# Patient Record
Sex: FEMALE | Race: WHITE | Employment: FULL TIME | URBAN - METROPOLITAN AREA
[De-identification: names, ages, dates, MRNs, and addresses within clinical notes are randomized per-mention and may not be internally consistent; named-entity substitution may affect disease eponyms.]

---

## 2018-07-25 ENCOUNTER — HOSPITAL ENCOUNTER (OUTPATIENT)
Dept: PHYSICAL THERAPY | Age: 33
Discharge: HOME | End: 2018-07-25
Payer: COMMERCIAL

## 2018-07-25 PROCEDURE — 97016 VASOPNEUMATIC DEVICE THERAPY: CPT

## 2018-07-25 PROCEDURE — 97162 PT EVAL MOD COMPLEX 30 MIN: CPT

## 2018-07-25 PROCEDURE — 97110 THERAPEUTIC EXERCISES: CPT

## 2018-07-25 NOTE — PROGRESS NOTES
Emily Rincon 31  [facility] [address], [address], [facility], [facility] - Phone: [phone]  Fax: [phone] / [address] [facility]  Patient Name: Rod Curtis : 1985   Medical   Diagnosis: Left knee pain [M25.562] Treatment Diagnosis: Left knee pain [M25.562]   Onset Date: 2018     Referral Source: Ekaterina Schulz MD Start of Care [facility]): 2018   Prior Hospitalization: See medical history Provider #: [id number]   Prior Level of Function: Independent and pain free ambulation and recreational activity   Comorbidities: HTN    Medications: Verified on Patient Summary List   The Plan of Care and following information is based on the information from the initial evaluation.   ==================================================================================  Assessment / key information:  Patient is a 29 y.o. female who presents to [facility] at [facility] with diagnosis of Left knee pain [M25.562]. Patient is s/p L knee dislocation 2018. Pt describes left knee pain as an intermittent stiffness located in the anterior aspect of the knee. Patient's pain level is rated as 1/10 at the best, 3/10 currently, and 8/10 at the worst. Pain increases with knee flexion, prolonged ambulation (45 minutes), prolonged standing, and descending stairs, decreases with rest. Gait analysis revealed antalgic gait abnormality and demonstrated decreased L LE step length and asymetrical L LE swing. Upon objective evaluation patient presents with impaired and painful AROM/PROM of left knee, inferior patellar hypomobility, impaired knee strength in flexion and extension, edema of 0.75cm  at the joint line and decreased flexibility of  hamstring, quadriceps and, gastroc muscles. AROM/PROM of left knee as follows:  flexion 75/86 and extension -10/-5.  Patient scored 51 on FOTO indicating decreased function and quality of life. Patient can benefit from skilled PT to increase left knee ROM, strength, joint mobility, flexibility, and balance, decrease swelling, tone, TTP, and pain to improve overall function and quality of life.  ==================================================================================  Problem List: pain affecting function, decrease ROM, decrease strength, edema affecting function, impaired gait/ balance, decrease ADL/ functional abilities, decrease activity tolerance, decrease flexibility/ joint mobility, decrease transfer abilities. Treatment Plan may include any combination of the following: Therapeutic exercise, Therapeutic activities, Neuromuscular re-education, Physical agent/modality, Gait/balance training, Manual therapy, Aquatic therapy and Patient education  Patient / Family readiness to learn indicated by: asking questions, trying to perform skills and interest  Persons(s) to be included in education: patient (P)  Barriers to Learning/Limitations: no  Measures taken:    Patient Goal (s): \"to get ROM back in my left knee\"   Patient self reported health status: good  Rehabilitation Potential: good   Short Term Goals: To be accomplished in 2  weeks:  1) Establish home exercise program.  2) Patient will report decreased c/o pain to < or = 6/10 to facilitate prolonged standing with manageable sx in left knee. 3) Increase left knee AROM by  6-8 degrees to facilitate improved gait mechanics with ambulation.  Long Term Goals: To be accomplished in  4  weeks:  1) Patient independent with HEP. 2) Patient will improve left knee AROM to 0-135 degrees to facilitate improved improved gait mechanics with ambulation. 3) Increase FOTO to 69 indicating improved function and quality of life. 4) Patient will increase left knee strength in flexion and extension to 4+/5 so patient has improved ability to perform household chores.   5) Patient to increased walking tolerance to 1 hour without pain in order improve ease with grocery shopping. 6) Patient with demonstrate reciprocal navigation of 2 flights of 6\" stairs without HR in order to facilitate improved ability to navigate stairs to return to OF. 7) Patient will demonstrate good eccentric quad control in forward tap down off 4 inch step with no compensation to facilitate normalized gait pattern for stair negotiation. Frequency / Duration:   Patient to be seen  2-3  times per week for 4-5  weeks:  Patient / Caregiver education and instruction: self care, activity modification and exercises  G-Codes (GP): n/a  Eval Complexity: History: MEDIUM  Complexity : 1-2 comorbidities / personal factors will impact the outcome/ POC Exam:HIGH Complexity : 4+ Standardized tests and measures addressing body structure, function, activity limitation and / or participation in recreation  Presentation: MEDIUM Complexity : Evolving with changing characteristics  Clinical Decision Making:MEDIUM Complexity : FOTO score of 26-74Overall Complexity:MEDIUM    Therapist Signature: Kelly Hadley Date: 6/82/6016   Certification Period: n/a Time: 8:24 AM   ==================================================================================  I certify that the above Physical Therapy Services are being furnished while the patient is under my care. I agree with the treatment plan and certify that this therapy is necessary. Physician Signature:        Date:       Time:     Please sign and return to [facility] or you may fax the signed copy to [phone]. Thank you.

## 2018-07-25 NOTE — PROGRESS NOTES
PHYSICAL THERAPY - DAILY TREATMENT NOTE    Patient Name: Nabila Monzon        Date: 2018  : 1985   YES Patient  Verified  Visit #:     Insurance: Payor: Talita Melton / Plan: Trinidad GAMINO / Product Type: Commerical /      In time: 8:34 am Out time: 9:30am   Total Treatment Time: 56     Medicare Time Tracking (below)   Total Timed Codes (min):  n/a 1:1 Treatment Time:  n/a     TREATMENT AREA =  Left knee pain [M25.562]  SUBJECTIVE  Pain Level (on 0 to 10 scale):  2-3  / 10   Medication Changes/New allergies or changes in medical history, any new surgeries or procedures? NO    If yes, update Summary List   Subjective Functional Status/Changes:  []  No changes reported     See POC       OBJECTIVE  Modalities Rationale:     decrease inflammation and decrease pain to improve patient's ability to perform functional ADLs   min [] Estim, type/location:                                      []  att     []  unatt     []  w/US     []  w/ice    []  w/heat    min []  Mechanical Traction: type/lbs                   []  pro   []  sup   []  int   []  cont    []  before manual    []  after manual    min []  Ultrasound, settings/location:      min []  Iontophoresis w/ dexamethasone, location:                                               []  take home patch       []  in clinic    min []  Ice     []  Heat    location/position:    10 min [x]  Vasopneumatic Device, press/temp: left knee, low pp, low temp in supine     min []  Other:    [x] Skin assessment post-treatment (if applicable):    [x]  intact    []  redness- no adverse reaction     []redness  adverse reaction:        18 min Therapeutic Exercise:  [x]  See flow sheet   Rationale:      increase ROM and increase strength to improve the patients ability to tolerate prolonged standing. min Patient Education:  YES  Reviewed HEP   []  Progressed/Changed HEP based on:         Other Objective/Functional Measures:   Physical Therapy Evaluation - Knee    Gait:  [] Normal    [] Abnormal    [x] Antalgic    [] NWB    Device: decreased L stance  Stair Navigation: Reciprocal ascending and non reciprocal descending stairs  R/L SLS on Foam/Firm: 30/29 seconds and 30/30 seconds    ROM / Strength  [] Unable to assess                  AROM                      PROM                   Strength (1-5)    Left Right Left Right Left Right   Hip Flexion     4- 4+    Extension     4+ 4+    Abduction     4 4+    Adduction     4- NT   Knee Flexion 75 138 86  2+ 5    Extension -10 +5 -5  2+ 5   Ankle Plantarflexion     4 4    Dorsiflexion     5- 5-     Flexibility: [] Unable to assess at this time  Hamstrings: R175/L158 deg   (L) Tightness= [] WNL   [] Min   [] Mod   [x] Severe     (R) Tightness= [x] WNL   [] Min   [] Mod   [] Severe  Quadriceps: R 125/L83   (L) Tightness= [] WNL   [] Min   [] Mod   [x] Severe    (R) Tightness= [] WNL   [x] Min   [] Mod   [] Severe  Gastroc:  R >neutral/L neutral   (L) Tightness= [] WNL   [] Min   [x] Mod   [] Severe    (R) Tightness= [] WNL   [x] Min   [] Mod   [] Severe    Optional Tests:  Patellar Mobility   []L [x]R Hypermobile [x]L []R Hypomobile (inferior patellar mob)        Girth Measurements:     Cm at  Cm above joint line   Cm at   Cm below joint line  Cm at joint line   Left     42.5 cm   Right      41.75 cm       Justification for Eval Complexity:   Patient History: MEDIUM  Complexity : 1-2 comorbidities / personal factors will impact the outcome/ POC  (HTN and Pregnancy)  Examination:HIGH Complexity : 4+ Standardized tests and measures addressing body structure, function, activity limitation and / or participation in recreation  (See POC and musculoskeletal examination attached)  Clinical Presentation: MEDIUM Complexity : Evolving with changing characteristics  (pain level 3/10 on average and 8/10 @ worst, intermittent pain)  Clinical Decision Making:MEDIUM Complexity : FOTO score of 26-74 (Foto 51/100)  Overall Complexity:MEDIUM Post Treatment Pain Level (on 0 to 10) scale:   2  / 10     ASSESSMENT  Assessment/Changes in Function:       See POC     []  See Progress Note/Recertification   Patient will continue to benefit from skilled PT services to modify and progress therapeutic interventions, address functional mobility deficits, address ROM deficits, address strength deficits, analyze and address soft tissue restrictions, analyze and cue movement patterns, analyze and modify body mechanics/ergonomics and assess and modify postural abnormalities to attain remaining goals.    Progress toward goals / Updated goals:    See POC     PLAN  [x]  Upgrade activities as tolerated YES Continue plan of care   []  Discharge due to :    []  Other:      Therapist: Manjit Dubose DPT     Date: 7/25/2018 Time: 8:24 AM     Future Appointments  Date Time Provider Soo Razo   7/31/2018 11:30 AM [address]   8/2/2018 5:30 PM Paige Rivas [facility]   8/7/2018 5:30 PM Paige Rivas [facility]   8/9/2018 5:30 PM Paige Rivas [facility]   8/14/2018 5:30 PM Paige Rivas, [facility]   8/16/2018 5:30 PM Paige Rivas [facility]   8/21/2018 5:30 PM Paige Rivas, [facility]   8/23/2018 5:30 PM Paige Rivas [facility]   8/28/2018 5:30 PM Paige Rivas, [facility]   8/30/2018 5:30 PM Paige Rivas, [facility]

## 2018-07-26 ENCOUNTER — HOSPITAL ENCOUNTER (OUTPATIENT)
Dept: PHYSICAL THERAPY | Age: 33
Discharge: HOME | End: 2018-07-26
Payer: COMMERCIAL

## 2018-07-26 PROCEDURE — 97140 MANUAL THERAPY 1/> REGIONS: CPT

## 2018-07-26 PROCEDURE — 97110 THERAPEUTIC EXERCISES: CPT

## 2018-07-26 PROCEDURE — 97016 VASOPNEUMATIC DEVICE THERAPY: CPT

## 2018-07-26 NOTE — PROGRESS NOTES
PHYSICAL THERAPY - DAILY TREATMENT NOTE    Patient Name: Nabila Farris        Date: 2018  : 1985   YES Patient  Verified  Visit #:     Insurance: Payor: Danilo Zavala / Plan: [employer] / Product Type: Commerical /      In time: 5:18 pm Out time: 6:10pm   Total Treatment Time: 52     Medicare Time Tracking (below)   Total Timed Codes (min):  n/a 1:1 Treatment Time:  n/a     TREATMENT AREA =  Left knee pain [M25.562]    SUBJECTIVE  Pain Level (on 0 to 10 scale):  2  / 10   Medication Changes/New allergies or changes in medical history, any new surgeries or procedures? NO    If yes, update Summary List   Subjective Functional Status/Changes:  []  No changes reported     Pt states \"Sintia been doing my exercises at my desk, I felt a little numbness earlier but its gone now\"          OBJECTIVE  Modalities Rationale:     decrease edema, decrease inflammation and decrease pain to improve patient's ability to perform ADLs.      min [] Estim, type/location:                                      []  att     []  unatt     []  w/US     []  w/ice    []  w/heat    min []  Mechanical Traction: type/lbs                   []  pro   []  sup   []  int   []  cont    []  before manual    []  after manual    min []  Ultrasound, settings/location:      min []  Iontophoresis w/ dexamethasone, location:                                               []  take home patch       []  in clinic    min []  Ice     []  Heat    location/position:    8 min [x]  Vasopneumatic Device, press/temp: left knee, mod pp, low temp in supine     min []  Other:    [x] Skin assessment post-treatment (if applicable):    [x]  intact    []  redness- no adverse reaction     []redness  adverse reaction:        36 min Therapeutic Exercise:  [x]  See flow sheet   Rationale:      increase ROM and increase strength to improve the patients ability to tolerate prolonged standing and walking    8 min Manual Therapy: L knee grade 1/2 superior and inferior patellar mobes, L knee PROM into flexion and extension in supine   Rationale:      decrease pain, increase ROM, increase tissue extensibility and decrease trigger points to improve patient's ability to ambulate with normalized gait. min Patient Education:  YES  Reviewed HEP   []  Progressed/Changed HEP based on: Other Objective/Functional Measures: Added rocking  R knee AROM = -9/-6 to 98/108 deg     Post Treatment Pain Level (on 0 to 10) scale:   3  / 10     ASSESSMENT  Assessment/Changes in Function:     Unable to perform full revolutions on recumbent bike thus, warmed up with rocking on bike in order for L knee flexion AAROM. Demonstrated R LE weight shift compensatory movement with mini squatting, VCing for even weightbearing. []  See Progress Note/Recertification   Patient will continue to benefit from skilled PT services to modify and progress therapeutic interventions, address functional mobility deficits, address ROM deficits, address strength deficits, analyze and address soft tissue restrictions, analyze and cue movement patterns, analyze and modify body mechanics/ergonomics, assess and modify postural abnormalities, address imbalance/dizziness and instruct in home and community integration to attain remaining goals. Progress toward goals / Updated goals:    First visit after initial evaluation. Progress tx per POC.         PLAN  [x]  Upgrade activities as tolerated YES Continue plan of care   []  Discharge due to :    []  Other:      Therapist: Brian Cabral    Date: 7/26/2018 Time: 6:52 AM     Future Appointments  Date Time Provider Soo Razo   7/26/2018 5:00 PM [address]   7/31/2018 11:30 AM [address]   8/2/2018 5:30 PM Agusto Callaway PTA [facility]   8/7/2018 5:30 PM Agusto Callaway PTA [facility]   8/9/2018 5:30 PM Agusto Callaway PTA [facility]   8/14/2018 5:30 PM Agusto Callaway PTA [facility]   8/16/2018 5:30 PM Renu Wesley, [facility]   8/21/2018 5:30 PM Renu Wesley, [facility]   8/23/2018 5:30 PM Renu Wesley, [facility]   8/28/2018 5:30 PM Renu Wesley, [facility]   8/30/2018 5:30 PM Renu Wesley, [facility]

## 2018-07-31 ENCOUNTER — HOSPITAL ENCOUNTER (OUTPATIENT)
Dept: PHYSICAL THERAPY | Age: 33
Discharge: HOME | End: 2018-07-31
Payer: COMMERCIAL

## 2018-07-31 PROCEDURE — 97140 MANUAL THERAPY 1/> REGIONS: CPT

## 2018-07-31 PROCEDURE — 97016 VASOPNEUMATIC DEVICE THERAPY: CPT

## 2018-07-31 PROCEDURE — 97110 THERAPEUTIC EXERCISES: CPT

## 2018-07-31 NOTE — PROGRESS NOTES
PHYSICAL THERAPY - DAILY TREATMENT NOTE Patient Name: Ramses Abdul        Date: 2018 : 1985   YES Patient  Verified Visit #:   3   of   12  Insurance: Payor: [address] [place] [address] / Plan: Leroy GAMINO / Product Type: Commerical / In time: 11:38 Out time: 12:36 Total Treatment Time: 62 Medicare Time Tracking (below) Total Timed Codes (min):  na 1:1 Treatment Time:  na  
 
TREATMENT AREA =  Left knee pain [M25.562] SUBJECTIVE Pain Level (on 0 to 10 scale):  3  / 10 Medication Changes/New allergies or changes in medical history, any new surgeries or procedures? NO    If yes, update Summary List  
Subjective Functional Status/Changes:  []  No changes reported Increased medial knee pain over the weekend. Pain went as high as /10. More brave with stairs. Doing mostly step over step. OBJECTIVE Modalities Rationale:  decrease edema, decrease inflammation and decrease pain to improve patient's ability to perform ADLs. min [] Estim, type/location:    
                                 []  att     []  unatt     []  w/US     []  w/ice    []  w/heat  
 min []  Mechanical Traction: type/lbs   
               []  pro   []  sup   []  int   []  cont    []  before manual    []  after manual  
 min []  Ultrasound, settings/location:    
 min []  Iontophoresis w/ dexamethasone, location:   
                                           []  take home patch       []  in clinic  
 min []  Ice     []  Heat    location/position:   
10 min [x]  Vasopneumatic Device, press/temp: left knee, mod pp, low temp in supine   
 min []  Other:   
[x] Skin assessment post-treatment (if applicable):   
[x]  intact    []  redness- no adverse reaction    
[]redness  adverse reaction:     
40 min Therapeutic Exercise:  [x]  See flow sheet Rationale:      increase ROM and increase strength to improve the patients ability to tolerate prolonged standing and walking 
  
8 min Manual Therapy: L knee grade 1/2 superior and inferior patellar mobes,  Retrograde massage with patient instruction in supine. Rationale:      decrease pain, increase ROM, increase tissue extensibility and decrease trigger points to improve patient's ability to ambulate with normalized gait.  
  
 
 
 min Patient Education:  YES  Reviewed HEP []  Progressed/Changed HEP based on:  Retrograde massage followed by ice 10 minutes. Do not go into a pain zone with exercise or ADLs. Other Objective/Functional Measures: AROM -5 to 110 pretreatment, 0-115 after treatment. atient able to do full revolution on recumbent bike for 1 minute. Added tap downs, TKE with ball. Post Treatment Pain Level (on 0 to 10) scale:   1  / 10 ASSESSMENT Assessment/Changes in Function:  
 
Increasing AROM of L knee. Gait still mildly antalgic. Quad set still decreased. []  See Progress Note/Recertification Patient will continue to benefit from skilled PT services to modify and progress therapeutic interventions, address functional mobility deficits, address ROM deficits, address strength deficits, analyze and address soft tissue restrictions, analyze and cue movement patterns, analyze and modify body mechanics/ergonomics, assess and modify postural abnormalities, address imbalance/dizziness and instruct in home and community integration to attain remaining goals Progress toward goals / Updated goals: 
 
1) Establish home exercise program.  Met 
2) Patient will report decreased c/o pain to < or = 6/10 to facilitate prolonged standing with manageable sx in left knee. 3) Increase left knee AROM by  6-8 degrees to facilitate improved gait mechanics with ambulation. Met PLAN [x]  Upgrade activities as tolerated YES Continue plan of care  
[]  Discharge due to :   
[]  Other:   
 
Therapist: Leobardo Grace PT Date: 7/31/2018 Time: 11:27 AM  
 
Future Appointments Date Time Provider Soo Razo 8/2/2018 5:30 PM Jesus Castro PTA [facility]  
8/7/2018 5:30 PM Cristino Pallas, [facility]  
8/9/2018 5:30 PM Cristino Pallas, [facility]  
8/14/2018 5:30 PM Cristino Pallas, [facility]  
8/16/2018 5:30 PM Cristino Pallas, [facility]  
8/21/2018 5:30 PM Cristino Pallas, [facility]  
8/23/2018 5:30 PM Cristino Pallas, [facility]  
8/28/2018 5:30 PM Cristino Pallas, [facility]  
8/30/2018 5:30 PM Cristino Pallas, [facility]

## 2018-08-02 ENCOUNTER — HOSPITAL ENCOUNTER (OUTPATIENT)
Dept: PHYSICAL THERAPY | Age: 33
Discharge: HOME | End: 2018-08-02
Payer: COMMERCIAL

## 2018-08-02 PROCEDURE — 97110 THERAPEUTIC EXERCISES: CPT

## 2018-08-02 PROCEDURE — 97016 VASOPNEUMATIC DEVICE THERAPY: CPT

## 2018-08-02 NOTE — PROGRESS NOTES
PHYSICAL THERAPY - DAILY TREATMENT NOTE Patient Name: Renee Tyson        Date: 2018 : 1985   YES Patient  Verified Visit #:     Insurance: Payor: [employer] / Plan: Jenny Clark RPN / Product Type: Commerical / In time: 5:40 pm Out time: 6;40 pm  
Total Treatment Time: 60 Medicare Time Tracking (below) Total Timed Codes (min):  n/a 1:1 Treatment Time:  n/a  
 
TREATMENT AREA =  Left knee pain [M25.562] SUBJECTIVE Pain Level (on 0 to 10 scale): 2-3  / 10 Medication Changes/New allergies or changes in medical history, any new surgeries or procedures? NO    If yes, update Summary List  
Subjective Functional Status/Changes:  []  No changes reported Pt reports it feels fine its just when I try to do stairs that and the weather. Achy -pain today. OBJECTIVE Modalities Rationale:     decrease edema, decrease inflammation, decrease pain and increase tissue extensibility to improve patient's ability to perform prolonged standing, walking, descending stairs 
 min [] Estim, type/location:    
                                 []  att     []  unatt     []  w/US     []  w/ice    []  w/heat  
 min []  Mechanical Traction: type/lbs   
               []  pro   []  sup   []  int   []  cont    []  before manual    []  after manual  
 min []  Ultrasound, settings/location:    
 min []  Iontophoresis w/ dexamethasone, location:   
                                           []  take home patch       []  in clinic  
 min []  Ice     []  Heat    location/position:   
10 min [x]  Vasopneumatic Device, press/temp: 26 degrees med  
 min []  Other:   
[x] Skin assessment post-treatment (if applicable):   
[x]  intact    []  redness- no adverse reaction    
[]redness  adverse reaction:     
 
50 min Therapeutic Exercise:  [x]  See flow sheet Rationale:      increase ROM and increase strength to improve the patients ability to  perform prolonged standing, walking, descending stairs min Patient Education:  YES  Reviewed HEP []  Progressed/Changed HEP based on: Other Objective/Functional Measures: AROM: +3 to 132 degrees Increase bike x 5 min rocking to full ROM, TKE to 10 reps Post Treatment Pain Level (on 0 to 10) scale:  1  / 10 ASSESSMENT Assessment/Changes in Function:  
See progress note [x]  See Progress Note/Recertification Patient will continue to benefit from skilled PT services to modify and progress therapeutic interventions, address functional mobility deficits, address ROM deficits, address strength deficits and instruct in home and community integration to attain remaining goals. Progress toward goals / Updated goals: 
 
See progress note PLAN 
[]  Upgrade activities as tolerated YES Continue plan of care  
[]  Discharge due to :   
[]  Other:   
 
Therapist: Bailey Armstrong PTA Date: 8/2/2018 Time: 6:40 PM  
 
Future Appointments Date Time Provider Soo Razo 8/7/2018 5:30 PM Bailey Patti, PTA [facility]  
8/9/2018 5:30 PM Bailey Patti, PTA [facility]  
8/14/2018 5:30 PM Bailey Patti, PTA [facility]  
8/16/2018 5:30 PM Bailey Patti, PTA [facility]  
8/21/2018 5:30 PM Bailey Patti, PTA [facility]  
8/23/2018 5:30 PM Bailey Patti, [facility]  
8/28/2018 5:30 PM Bailey Patti, PTA [facility]  
8/30/2018 5:30 PM Bailey Patti, [facility]

## 2018-08-02 NOTE — PROGRESS NOTES
[facility] PHYSICAL THERAPY AT [address], [place] [address], [place] [address] - Phone: [phone]  Fax: [phone] PROGRESS NOTE Patient Name: Abel Echevarria : 1985 Treatment/Medical Diagnosis: Left knee pain [M25.562] Referral Source: Andreina Greenberg MD    
Date of Initial Visit: 18 Attended Visits: 4 Missed Visits: 0  
 
SUMMARY OF TREATMENT Physical Therapy treatment has consisted of Therapeutic exercise for L LE ROM and strengthening, Manual Therapy, HEP, ice with compression. CURRENT STATUS Patient has progressed well in Physical Therapy, consistently reporting improving ROM, decreasing pain, and increased functional ability. Functional deficits: increases with knee flexion, prolonged walking, prolonged standing, descending stairs. Pt's current pain range is 0 to 4/10. Pt reports intermittent use of knee brace for support. Functional improvements are knee flexion, prolonged walking, prolonged standing, descending stairs  Pt would benefit from continued PT intervention in order to improve knee  AROM/PROM, strength, flexibility, Functional mobility, and address remaining impairments. Goal/Measure of Progress Goal Met? 1.   Establish home exercise program  
Status at last Eval: dependent Current Status: Pt instructed in initial HEP. yes 2. Patient will report decreased c/o pain to < or = 6/10 to facilitate prolonged standing with manageable sx in left knee Status at last Eval: Pain: 1-8/10 Current Status: Pain: 0 to 4/10 yes 3. Increase left knee AROM by  6-8 degrees to facilitate improved gait mechanics with ambulation Status at last Eval: -10 to 75 degrees Current Status: +3 to 132 degrees yes New Goals to be achieved in __4__  weeks: 
1) Patient independent with HEP. 2) Increase FOTO to 69 indicating improved function and quality of life.  
3) Patient will increase left knee strength in flexion and extension to 4+/5 so patient has improved ability to perform household chores. 4) Patient to increased walking tolerance to 1 hour without pain in order improve ease with grocery shopping. 5) Patient with demonstrate reciprocal navigation of 2 flights of 6\" stairs without HR in order to facilitate improved ability to navigate stairs to return to PLOF. 6) Patient will demonstrate good eccentric quad control in forward tap down off 4 inch step with no compensation to facilitate normalized gait pattern for stair negotiation. 
  
RECOMMENDATIONS Plan to continue 2x week x 4 weeks If you have any questions/comments please contact us directly at  [phone]. Thank you for allowing us to assist in the care of your patient. LPTA Signature: Bailey Armstrong, PTA  Date: 8/2/2018 PT Signature: Rupert Howard PT Time: 6:30 PM  
NOTE TO PHYSICIAN:  PLEASE COMPLETE THE ORDERS BELOW AND FAX TO [facility] Physical Therapy: [phone]. If you are unable to process this request in 24 hours please contact our office:  [phone]. 
 
___ I have read the above report and request that my patient continue as recommended.  
___ I have read the above report and request that my patient continue therapy with the following changes/special instructions:_________________________________________________________  
___ I have read the above report and request that my patient be discharged from therapy.   
 
Physician Signature:        Date:       Time:

## 2018-08-07 ENCOUNTER — HOSPITAL ENCOUNTER (OUTPATIENT)
Dept: PHYSICAL THERAPY | Age: 33
Discharge: HOME | End: 2018-08-07
Payer: COMMERCIAL

## 2018-08-07 PROCEDURE — 97016 VASOPNEUMATIC DEVICE THERAPY: CPT

## 2018-08-07 PROCEDURE — 97110 THERAPEUTIC EXERCISES: CPT

## 2018-08-07 NOTE — PROGRESS NOTES
PHYSICAL THERAPY - DAILY TREATMENT NOTE    Patient Name: April OTILIA Rutledge        Date: 2018  : 1985   YES Patient  Verified  Visit #:     Insurance: Payor: Franco Mazariegos / Plan: Rina GAMINO / Product Type: Commerical /      In time: 5:29 pm Out time: 6:19 pm   Total Treatment Time: 50     Medicare Time Tracking (below)   Total Timed Codes (min): n/a 1:1 Treatment Time:  n/a     TREATMENT AREA =  Left knee pain [M25.562]    SUBJECTIVE  Pain Level (on 0 to 10 scale):  2  / 10   Medication Changes/New allergies or changes in medical history, any new surgeries or procedures? NO    If yes, update Summary List   Subjective Functional Status/Changes:  []  No changes reported     Pt reports she saw MD. He  Wants me in the brace. Pt s main complaint: tenderness medial knee.  \"When I take the stairs I can feel the pull\"       OBJECTIVE  Modalities Rationale:     decrease edema, decrease inflammation, decrease pain and increase tissue extensibility to improve patient's ability to descend stairs   min [] Estim, type/location:                                      []  att     []  unatt     []  w/US     []  w/ice    []  w/heat    min []  Mechanical Traction: type/lbs                   []  pro   []  sup   []  int   []  cont    []  before manual    []  after manual    min []  Ultrasound, settings/location:      min []  Iontophoresis w/ dexamethasone, location:                                               []  take home patch       []  in clinic    min []  Ice     []  Heat    location/position:    10 min [x]  Vasopneumatic Device, press/temp: Low 34 degrees left knee    min []  Other:    [x] Skin assessment post-treatment (if applicable):    [x]  intact    []  redness- no adverse reaction     []redness  adverse reaction:        40 min Therapeutic Exercise:  [x]  See flow sheet   Rationale:      increase ROM and increase strength to improve the patients ability to descend stairs      min Patient Education:  Alyce An Reviewed HEP   []  Progressed/Changed HEP based on: Other Objective/Functional Measures: Add supine hip ADD, increased reps 4 inch tap downs  AROM: +1 to 133 degrees     Post Treatment Pain Level (on 0 to 10) scale:   0 / 10     ASSESSMENT  Assessment/Changes in Function:   Pt requested to decrease PT to 1x per week secondary to financial reasons. Exercises in standing performed in brace. Pt demonstrating mild muscle guarding/stiffness in AROM supine knee flexion. pt education in proper supportive foot wear, use of brace for support per MD recommendations. []  See Progress Note/Recertification   Patient will continue to benefit from skilled PT services to modify and progress therapeutic interventions, address functional mobility deficits, address ROM deficits, address strength deficits, analyze and address soft tissue restrictions and instruct in home and community integration to attain remaining goals. Progress toward goals / Updated goals:  1) Patient independent with HEP. 2) Increase FOTO to 69 indicating improved function and quality of life. 3) Patient will increase left knee strength in flexion and extension to 4+/5 so patient has improved ability to perform household chores. 4) Patient to increased walking tolerance to 1 hour without pain in order improve ease with grocery shopping. 5) Patient with demonstrate reciprocal navigation of 2 flights of 6\" stairs without HR in order to facilitate improved ability to navigate stairs to return to OF.    6) Patient will demonstrate good eccentric quad control in forward tap down off 4 inch step with no compensation to facilitate normalized gait pattern for stair negotiation     PLAN  []  Upgrade activities as tolerated YES Continue plan of care   []  Discharge due to :    []  Other:      Therapist: Dalton Saldana PTA    Date: 8/7/2018 Time: 6:19  PM     Future Appointments  Date Time Provider Soo Razo   8/7/2018 5:30 PM Dalton Saldana [facility]   8/9/2018 5:30 PM Camella Gloria, [facility]   8/14/2018 5:30 PM Camella Gloria, [facility]   8/16/2018 5:30 PM Camella Gloria, [facility]   8/21/2018 5:30 PM Camella Gloria, [facility]   8/23/2018 5:30 PM Camella Gloria, [facility]   8/28/2018 5:30 PM Camella Gloria, [facility]   8/30/2018 5:30 PM Camella Gloria, [facility]

## 2018-08-09 ENCOUNTER — APPOINTMENT (OUTPATIENT)
Dept: PHYSICAL THERAPY | Age: 33
End: 2018-08-09
Payer: COMMERCIAL

## 2018-08-14 ENCOUNTER — HOSPITAL ENCOUNTER (OUTPATIENT)
Dept: PHYSICAL THERAPY | Age: 33
Discharge: HOME | End: 2018-08-14
Payer: COMMERCIAL

## 2018-08-14 PROCEDURE — 97016 VASOPNEUMATIC DEVICE THERAPY: CPT

## 2018-08-14 PROCEDURE — 97110 THERAPEUTIC EXERCISES: CPT

## 2018-08-14 NOTE — PROGRESS NOTES
PHYSICAL THERAPY - DAILY TREATMENT NOTE    Patient Name: Nabila Monzon        Date: 2018  : 1985   YES Patient  Verified  Visit #:     Insurance: Payor: Talita Melton / Plan: Trinidad GAMINO / Product Type: Commerical /      In time: 5:33 pm Out time: 6:15 pm   Total Treatment Time: 42     Medicare Time Tracking (below)   Total Timed Codes (min):  n/a 1:1 Treatment Time:  n/a     TREATMENT AREA =  Left knee pain [M25.562]    SUBJECTIVE  Pain Level (on 0 to 10 scale): 0 / 10   Medication Changes/New allergies or changes in medical history, any new surgeries or procedures? NO    If yes, update Summary List   Subjective Functional Status/Changes:  []  No changes reported     I'm still finding myself skipping on the stairs.  I feel like I'm going up and down stairs all day long         OBJECTIVE  Modalities Rationale:     decrease edema, decrease inflammation, decrease pain and increase tissue extensibility to improve patient's ability to perform prolonged standing, walking   min [] Estim, type/location:                                      []  att     []  unatt     []  w/US     []  w/ice    []  w/heat    min []  Mechanical Traction: type/lbs                   []  pro   []  sup   []  int   []  cont    []  before manual    []  after manual    min []  Ultrasound, settings/location:      min []  Iontophoresis w/ dexamethasone, location:                                               []  take home patch       []  in clinic    min []  Ice     []  Heat    location/position:    10 min [x]  Vasopneumatic Device, press/temp: Med 34 degrees    min []  Other:    [x] Skin assessment post-treatment (if applicable):    [x]  intact    []  redness- no adverse reaction     []redness  adverse reaction:        32 min Therapeutic Exercise:  [x]  See flow sheet   Rationale:      increase ROM and increase strength to improve the patients ability to perform prolonged standing, walking        min Patient Education:  Sachi Tesfaye Reviewed HEP   []  Progressed/Changed HEP based on: Other Objective/Functional Measures:    Advanced supine 4 way SLR 2 sets 10x, ADD: 1x10; 1x5  Increase bike x 6 min, YTB for TKE     Post Treatment Pain Level (on 0 to 10) scale:  0 / 10     ASSESSMENT  Assessment/Changes in Function:   Pt would benefit from continued eccentric quad control on Left LE to improve stair management. Pt demonstrating mild/moderate effort in supine SLR ADD. []  See Progress Note/Recertification   Patient will continue to benefit from skilled PT services to modify and progress therapeutic interventions, address functional mobility deficits, address ROM deficits, address strength deficits, analyze and address soft tissue restrictions and instruct in home and community integration to attain remaining goals. Progress toward goals / Updated goals:  1) Patient independent with HEP. 2) Increase FOTO to 69 indicating improved function and quality of life. 3) Patient will increase left knee strength in flexion and extension to 4+/5 so patient has improved ability to perform household chores. 4) Patient to increased walking tolerance to 1 hour without pain in order improve ease with grocery shopping. 5) Patient with demonstrate reciprocal navigation of 2 flights of 6\" stairs without HR in order to facilitate improved ability to navigate stairs to return to OF.    6) Patient will demonstrate good eccentric quad control in forward tap down off 4 inch step with no compensation to facilitate normalized gait pattern for stair negotiation     PLAN  []  Upgrade activities as tolerated YES Continue plan of care   []  Discharge due to :    []  Other:      Therapist: Maldonado Amado PTA    Date: 8/14/2018 Time: 6:15 PM     Future Appointments  Date Time Provider Soo Razo   8/21/2018 5:30 PM Jose Sal [facility]   8/28/2018 5:30 PM Maldonado Amado PTA [facility]

## 2018-08-16 ENCOUNTER — APPOINTMENT (OUTPATIENT)
Dept: PHYSICAL THERAPY | Age: 33
End: 2018-08-16
Payer: COMMERCIAL

## 2018-08-21 ENCOUNTER — HOSPITAL ENCOUNTER (OUTPATIENT)
Dept: PHYSICAL THERAPY | Age: 33
Discharge: HOME | End: 2018-08-21
Payer: COMMERCIAL

## 2018-08-21 PROCEDURE — 97110 THERAPEUTIC EXERCISES: CPT

## 2018-08-21 PROCEDURE — 97016 VASOPNEUMATIC DEVICE THERAPY: CPT

## 2018-08-21 NOTE — PROGRESS NOTES
[address] [facility] PHYSICAL THERAPY AT [address] - Phone: [phone]  Fax: [phone] SUMMARY  Patient Name: Darling Parker : 1985   Treatment/Medical Diagnosis: Left knee pain [M25.562]   Referral Source: Ida De La Cruz MD     Date of Initial Visit: 18 Attended Visits: 7 Missed Visits: 0     SUMMARY OF TREATMENT  Physical Therapy treatment has consisted of Therapeutic exercise for L LE ROM and strengthening, Manual Therapy, HEP, ice with compression. CURRENT STATUS  Patient has progressed well in Physical Therapy, consistently reporting improving ROM, decreasing pain, and increased functional ability. Functional deficits: descending stairs. Pt's current pain range is 0 to 2/10. Pt reports intermittent use of knee brace for support. Functional improvements are knee flexion, prolonged walking, prolonged standing, descending stairs. Pt is independent in her discharge HEP. Goal/Measure of Progress Goal Met? 1. Patient independent with HEP. Status at last Eval: dependent Current Status: Pt I with HEP. yes   2. Increase FOTO to 69 indicating improved function and quality of life. Status at last Eval: 47 Current Status: 73 yes   3. Patient will increase left knee strength in flexion and extension to 4+/5 so patient has improved ability to perform household chores   Status at last Eval: Flexion/extension: 2+/5 Current Status: Flexion: 5/5, extension: 4+/5 yes   4.  4) Patient to increased walking tolerance to 1 hour without pain in order improve ease with grocery shopping. 5) Patient with demonstrate reciprocal navigation of 2 flights of 6\" stairs without HR in order to facilitate improved ability to navigate stairs to return to OF.    6) Patient will demonstrate good eccentric quad control in forward tap down off 4 inch step with no compensation to facilitate normalized gait pattern for stair negotiation   Status at last Eval: Decreased walking tolerance < 1 hour, stair negotiation Current Status: Grocery shopping not limited  Pt demonstrating good quad control from 4 inch step. yes   RECOMMENDATIONS  Discontinue therapy. Progressing towards or have reached established goals. If you have any questions/comments please contact us directly at [phone]. Thank you for allowing us to assist in the care of your patient.     LPTA Signature: Fer Rhodes [place] Date: 8-21-18   Therapist Signature: Miguel Potter DPT Time: 6:06 PM

## 2018-08-21 NOTE — PROGRESS NOTES
PHYSICAL THERAPY - DAILY TREATMENT NOTE    Patient Name: Nabila Perez        Date: 2018  : 1985   YES Patient  Verified  Visit #:     Insurance: Payor: Dre Needs / Plan: Shital Yanez RPN / Product Type: Commerical /      In time: 5:38 pm Out time: 6:43 apm   Total Treatment Time: 61     Medicare Time Tracking (below)   Total Timed Codes (min):n/a 1:1 Treatment Time:  n/a     TREATMENT AREA =  Left knee pain [M25.562]    SUBJECTIVE  Pain Level (on 0 to 10 scale):  1  / 10   Medication Changes/New allergies or changes in medical history, any new surgeries or procedures?     NO    If yes, update Summary List   Subjective Functional Status/Changes:  []  No changes reported     Pt reports she is still feeling the inside of her knee (pull) while going down the stairs but feeling much better overall          OBJECTIVE  Modalities Rationale:     decrease edema, decrease inflammation, decrease pain and increase tissue extensibility to improve patient's ability to perform functional ADLs   min [] Estim, type/location:                                      []  att     []  unatt     []  w/US     []  w/ice    []  w/heat    min []  Mechanical Traction: type/lbs                   []  pro   []  sup   []  int   []  cont    []  before manual    []  after manual    min []  Ultrasound, settings/location:      min []  Iontophoresis w/ dexamethasone, location:                                               []  take home patch       []  in clinic    min []  Ice     []  Heat    location/position:    10 min [x]  Vasopneumatic Device, press/temp: Med 34-38 degrees    min []  Other:    [x] Skin assessment post-treatment (if applicable):    [x]  intact    []  redness- no adverse reaction     []redness  adverse reaction:        49 min Therapeutic Exercise:  [x]  See flow sheet   Rationale:      increase ROM and increase strength to improve the patients ability to descend stairs      min Patient Education:  YES  Reviewed HEP   []  Progressed/Changed HEP based on: Other Objective/Functional Measures:  AROM: +5 to 143 degrees  Review written discharge instructions   Post Treatment Pain Level (on 0 to 10) scale:  0  / 10     ASSESSMENT  Assessment/Changes in Function:   See discharge      [x]  See Progress Note/Recertification   Patient will continue to benefit from skilled PT services to modify and progress therapeutic interventions, address functional mobility deficits, address ROM deficits, address strength deficits, analyze and address soft tissue restrictions and instruct in home and community integration to attain remaining goals.    Progress toward goals / Updated goals:    See discharge     PLAN  []  Upgrade activities as tolerated NO Continue plan of care   []  Discharge due to :    []  Other:      Therapist: Prabhakar Miller PTA    Date: 8/21/2018 Time: 5:37 PM     Future Appointments  Date Time Provider Soo Razo   8/28/2018 5:30 PM Prabhakar Miller PTA [facility]

## 2018-08-23 ENCOUNTER — APPOINTMENT (OUTPATIENT)
Dept: PHYSICAL THERAPY | Age: 33
End: 2018-08-23
Payer: COMMERCIAL

## 2018-08-28 ENCOUNTER — APPOINTMENT (OUTPATIENT)
Dept: PHYSICAL THERAPY | Age: 33
End: 2018-08-28
Payer: COMMERCIAL

## 2018-08-30 ENCOUNTER — APPOINTMENT (OUTPATIENT)
Dept: PHYSICAL THERAPY | Age: 33
End: 2018-08-30
Payer: COMMERCIAL